# Patient Record
Sex: MALE | Race: WHITE | NOT HISPANIC OR LATINO | URBAN - METROPOLITAN AREA
[De-identification: names, ages, dates, MRNs, and addresses within clinical notes are randomized per-mention and may not be internally consistent; named-entity substitution may affect disease eponyms.]

---

## 2024-03-07 ENCOUNTER — EMERGENCY (EMERGENCY)
Facility: HOSPITAL | Age: 59
LOS: 1 days | Discharge: HOME | End: 2024-03-07
Attending: STUDENT IN AN ORGANIZED HEALTH CARE EDUCATION/TRAINING PROGRAM | Admitting: STUDENT IN AN ORGANIZED HEALTH CARE EDUCATION/TRAINING PROGRAM
Payer: COMMERCIAL

## 2024-03-07 VITALS
HEART RATE: 79 BPM | SYSTOLIC BLOOD PRESSURE: 145 MMHG | OXYGEN SATURATION: 98 % | RESPIRATION RATE: 18 BRPM | DIASTOLIC BLOOD PRESSURE: 75 MMHG | TEMPERATURE: 98 F

## 2024-03-07 VITALS
TEMPERATURE: 98 F | OXYGEN SATURATION: 100 % | DIASTOLIC BLOOD PRESSURE: 86 MMHG | HEIGHT: 71 IN | SYSTOLIC BLOOD PRESSURE: 159 MMHG | HEART RATE: 97 BPM | WEIGHT: 169.98 LBS | RESPIRATION RATE: 18 BRPM

## 2024-03-07 DIAGNOSIS — I10 ESSENTIAL (PRIMARY) HYPERTENSION: ICD-10-CM

## 2024-03-07 DIAGNOSIS — E78.5 HYPERLIPIDEMIA, UNSPECIFIED: ICD-10-CM

## 2024-03-07 PROCEDURE — 99284 EMERGENCY DEPT VISIT MOD MDM: CPT

## 2024-03-07 PROCEDURE — 99282 EMERGENCY DEPT VISIT SF MDM: CPT

## 2024-03-07 NOTE — ED PROVIDER NOTE - OBJECTIVE STATEMENT
58 -year-old male with newly diagnosed hypertension, recently started on Norvasc, hyperlipidemia presenting with hypertension.  Patient was at work, had dull bitemporal pressure without chest pain, blurry vision, nausea, vomiting, numbness, weakness, tingling.  No leg pain or leg swelling.  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  Patient was started on Norvasc 5 mg 3 weeks prior, compliant with medications.  Denies any acute complaints currently.  ROS as above.

## 2024-03-07 NOTE — ED ADULT NURSE NOTE - OBJECTIVE STATEMENT
57 yo M pmhx HTN on amlodipine 5mg daily presents to EDc o elevated BP. States was hhaving a mild pounding frontal HA which prompted him to take his BP 2 work this Am and was elevated in the 160s/90s, he was concerned and came to ED for eval. Last dose of amlodipine last night. Denies CP, SOB. No lymphadedenopathy

## 2024-03-07 NOTE — ED ADULT TRIAGE NOTE - CHIEF COMPLAINT QUOTE
Pt c/o elevated BP, started on blood pressure medication 10-15 days ago, takes medication at night. Reading PTA 160s systolic. Denies HA, cp, vision changes, dizziness.

## 2024-03-07 NOTE — ED PROVIDER NOTE - PATIENT PORTAL LINK FT
You can access the FollowMyHealth Patient Portal offered by [facility] by registering at the following website: [email or web address] By joining [employer]’s FollowMyHealth portal, you will also be able to view your health information using other applications (apps) compatible with our system.

## 2024-03-07 NOTE — ED PROVIDER NOTE - CLINICAL SUMMARY MEDICAL DECISION MAKING FREE TEXT BOX
58 year old with htn, asymptomatic, ekg nonischemic, well appearing, no concern for hypertensive emergency, given return precautions, told to fu with pcp tomorrow re: BP regimen, verbalized understanding

## 2024-03-07 NOTE — ED PROVIDER NOTE - PHYSICAL EXAMINATION
general: Well appearing, in no acute distress  HEENT: Normocephalic, atraumatic, extraocular movements intact  CV: Regular rate  Pulm: No respiratory distress, no tachypnea  Abd: Flat, no gross distension  Ext: warm and well perfused  Skin: No gross rashes or lesions  Neuro: Alert and oriented, moving all extremities,  cranial nerves II to XII intact, motor 5 out of 5 bilaterally, sensation intact bilaterally, no pronator drift, no dysmetria, normal gait, PERRL, EOMI

## 2024-03-07 NOTE — ED PROVIDER NOTE - NSFOLLOWUPINSTRUCTIONS_ED_ALL_ED_FT
You were seen in the Emergency Department for high blood pressure. Your EKG was within normal limits and your neurological exam was normal. Your BP here was 159/86. You should continue to take your medications as prescribed by your primary physician. Your primary physician may want to alter your medication dose or add another medication for optimal control of your blood pressure. Please follow up with him to discuss these options. Come to the ER for worsening symptoms, headache, blurry vision, chest pain, shortness of breath, lightheadedness, dizziness, numbness, weakness, tingling.     I hope you feel better soon!    Sincerely,  Joesph Irene MD